# Patient Record
Sex: FEMALE | Race: WHITE | NOT HISPANIC OR LATINO | Employment: FULL TIME | URBAN - METROPOLITAN AREA
[De-identification: names, ages, dates, MRNs, and addresses within clinical notes are randomized per-mention and may not be internally consistent; named-entity substitution may affect disease eponyms.]

---

## 2021-06-04 ENCOUNTER — OFFICE VISIT (OUTPATIENT)
Dept: FAMILY MEDICINE CLINIC | Facility: CLINIC | Age: 26
End: 2021-06-04
Payer: COMMERCIAL

## 2021-06-04 VITALS
RESPIRATION RATE: 16 BRPM | BODY MASS INDEX: 43.59 KG/M2 | OXYGEN SATURATION: 99 % | HEIGHT: 63 IN | HEART RATE: 72 BPM | WEIGHT: 246 LBS | SYSTOLIC BLOOD PRESSURE: 118 MMHG | DIASTOLIC BLOOD PRESSURE: 76 MMHG

## 2021-06-04 DIAGNOSIS — E61.1 IRON DEFICIENCY: ICD-10-CM

## 2021-06-04 DIAGNOSIS — E55.9 VITAMIN D DEFICIENCY: ICD-10-CM

## 2021-06-04 DIAGNOSIS — Z00.00 WELL ADULT EXAM: Primary | ICD-10-CM

## 2021-06-04 DIAGNOSIS — E53.8 B12 DEFICIENCY: ICD-10-CM

## 2021-06-04 DIAGNOSIS — E66.01 CLASS 3 SEVERE OBESITY DUE TO EXCESS CALORIES WITH SERIOUS COMORBIDITY AND BODY MASS INDEX (BMI) OF 40.0 TO 44.9 IN ADULT (HCC): ICD-10-CM

## 2021-06-04 DIAGNOSIS — L20.89 FLEXURAL ATOPIC DERMATITIS: ICD-10-CM

## 2021-06-04 DIAGNOSIS — Z11.3 SCREEN FOR STD (SEXUALLY TRANSMITTED DISEASE): ICD-10-CM

## 2021-06-04 DIAGNOSIS — Z79.899 OTHER LONG TERM (CURRENT) DRUG THERAPY: ICD-10-CM

## 2021-06-04 PROCEDURE — 3725F SCREEN DEPRESSION PERFORMED: CPT | Performed by: FAMILY MEDICINE

## 2021-06-04 PROCEDURE — 99395 PREV VISIT EST AGE 18-39: CPT | Performed by: FAMILY MEDICINE

## 2021-06-04 PROCEDURE — 1036F TOBACCO NON-USER: CPT | Performed by: FAMILY MEDICINE

## 2021-06-04 PROCEDURE — 3008F BODY MASS INDEX DOCD: CPT | Performed by: FAMILY MEDICINE

## 2021-06-04 RX ORDER — MONTELUKAST SODIUM 10 MG/1
10 TABLET ORAL
Qty: 90 TABLET | Refills: 3 | Status: SHIPPED | OUTPATIENT
Start: 2021-06-04

## 2021-06-04 RX ORDER — AMOXICILLIN 875 MG/1
TABLET, COATED ORAL
COMMUNITY
Start: 2021-05-07 | End: 2021-11-22

## 2021-06-04 RX ORDER — ALBUTEROL SULFATE 90 UG/1
AEROSOL, METERED RESPIRATORY (INHALATION)
COMMUNITY
Start: 2021-05-07 | End: 2021-12-23 | Stop reason: SDUPTHER

## 2021-06-04 RX ORDER — IBUPROFEN 800 MG/1
800 TABLET ORAL
COMMUNITY
End: 2022-05-05 | Stop reason: ALTCHOICE

## 2021-06-04 RX ORDER — CLOBETASOL PROPIONATE 0.5 MG/G
CREAM TOPICAL 2 TIMES DAILY
Qty: 60 G | Refills: 0 | Status: SHIPPED | OUTPATIENT
Start: 2021-06-04 | End: 2022-04-21 | Stop reason: SDUPTHER

## 2021-06-04 RX ORDER — TRAZODONE HYDROCHLORIDE 50 MG/1
TABLET ORAL
COMMUNITY
Start: 2021-06-03 | End: 2022-05-05 | Stop reason: ALTCHOICE

## 2021-06-04 RX ORDER — NORGESTIMATE AND ETHINYL ESTRADIOL 7DAYSX3 LO
1 KIT ORAL
COMMUNITY
End: 2021-07-14 | Stop reason: SDUPTHER

## 2021-06-04 RX ORDER — CYCLOBENZAPRINE HCL 5 MG
5 TABLET ORAL
COMMUNITY
End: 2021-11-22

## 2021-06-07 NOTE — PROGRESS NOTES
Assessment/Plan:    1  Well adult exam  -     Ambulatory referral to Gynecology; Future  -     Lipid panel; Future; Expected date: 06/04/2021  -     Comprehensive metabolic panel; Future; Expected date: 06/04/2021  -     CBC; Future; Expected date: 06/04/2021  -     UA w Reflex to Microscopic w Reflex to Culture; Future; Expected date: 06/04/2021    2  Flexural atopic dermatitis  -     montelukast (SINGULAIR) 10 mg tablet; Take 1 tablet (10 mg total) by mouth daily at bedtime  -     clobetasol (TEMOVATE) 0 05 % cream; Apply topically 2 (two) times a day  -     Iron Panel (Includes Ferritin, Iron Sat%, Iron, and TIBC); Future    3  Vitamin D deficiency  -     Vitamin D 25 hydroxy; Future; Expected date: 06/04/2021    4  B12 deficiency  -     Vitamin B12; Future; Expected date: 06/04/2021    5  Iron deficiency    6  Class 3 severe obesity due to excess calories with serious comorbidity and body mass index (BMI) of 40 0 to 44 9 in adult (HCC)  -     TSH, 3rd generation with Free T4 reflex; Future; Expected date: 06/04/2021    7  Other long term (current) drug therapy  -     Hemoglobin A1C; Future; Expected date: 06/04/2021    8  Screen for STD (sexually transmitted disease)  -     Hepatitis panel, acute; Future  -     RPR; Future  -     HIV 1/2 Antigen/Antibody (4th Generation) w Reflex [facility]; Future  -     HSV 1 and 2-Spec Ab, IgG w/Reflex; Future         Subjective:      Patient ID: Elo Rubin is a 31 y o  female  HPI   Here  For yearly   Also with rash on flexural skin       The following portions of the patient's history were reviewed and updated as appropriate: allergies, current medications, past family history, past medical history, past social history, past surgical history and problem list     Review of Systems   Constitutional: Negative for fever and unexpected weight change  HENT: Negative for nosebleeds and trouble swallowing  Eyes: Negative for visual disturbance     Respiratory: Negative for chest tightness and shortness of breath  Cardiovascular: Negative for chest pain, palpitations and leg swelling  Gastrointestinal: Negative for abdominal pain, constipation, diarrhea and nausea  Endocrine: Negative for cold intolerance  Genitourinary: Negative for dysuria and urgency  Musculoskeletal: Negative for joint swelling and myalgias  Skin: Positive for rash  Neurological: Negative for tremors, seizures and syncope  Hematological: Does not bruise/bleed easily  Psychiatric/Behavioral: Negative for hallucinations and suicidal ideas  Objective:      /76 (BP Location: Right arm, Patient Position: Sitting, Cuff Size: Large)   Pulse 72   Resp 16   Ht 5' 3" (1 6 m)   Wt 112 kg (246 lb)   SpO2 99%   BMI 43 58 kg/m²     No visits with results within 2 Week(s) from this visit  Latest known visit with results is:   No results found for any previous visit  Physical Exam  Vitals signs and nursing note reviewed  Constitutional:       Appearance: She is well-developed  She is obese  HENT:      Head: Normocephalic and atraumatic  Neck:      Musculoskeletal: Normal range of motion and neck supple  Cardiovascular:      Rate and Rhythm: Normal rate and regular rhythm  Heart sounds: Normal heart sounds  No murmur  Pulmonary:      Effort: Pulmonary effort is normal       Breath sounds: Normal breath sounds  No wheezing or rales  Abdominal:      General: Bowel sounds are normal  There is no distension  Palpations: Abdomen is soft  Tenderness: There is no abdominal tenderness  Musculoskeletal: Normal range of motion  General: No tenderness  Lymphadenopathy:      Cervical: No cervical adenopathy  Skin:     General: Skin is warm and dry  Capillary Refill: Capillary refill takes less than 2 seconds  Findings: Rash present  Neurological:      Mental Status: She is alert and oriented to person, place, and time  Cranial Nerves: No cranial nerve deficit  Sensory: No sensory deficit  Motor: No abnormal muscle tone  Psychiatric:         Behavior: Behavior normal          Thought Content: Thought content normal          Judgment: Judgment normal        BMI Counseling: Body mass index is 43 58 kg/m²  The BMI is above normal  Nutrition recommendations include decreasing portion sizes, encouraging healthy choices of fruits and vegetables and limiting drinks that contain sugar  Exercise recommendations include moderate physical activity 150 minutes/week  No pharmacotherapy was ordered               Estelle Jaimes MD  [place] [id number]

## 2021-07-14 DIAGNOSIS — Z00.00 WELL ADULT EXAM: Primary | ICD-10-CM

## 2021-07-14 NOTE — TELEPHONE ENCOUNTER
Patient  requested a refill of birth control  I could not understand what medication she was requesting     [facility] to verify we had the correct

## 2021-07-15 RX ORDER — NORGESTIMATE AND ETHINYL ESTRADIOL 7DAYSX3 LO
1 KIT ORAL DAILY
Qty: 28 TABLET | Refills: 3 | Status: SHIPPED | OUTPATIENT
Start: 2021-07-15 | End: 2021-11-22

## 2021-11-22 ENCOUNTER — OFFICE VISIT (OUTPATIENT)
Dept: OBGYN CLINIC | Facility: CLINIC | Age: 26
End: 2021-11-22
Payer: COMMERCIAL

## 2021-11-22 VITALS — DIASTOLIC BLOOD PRESSURE: 64 MMHG | WEIGHT: 251 LBS | SYSTOLIC BLOOD PRESSURE: 118 MMHG | BODY MASS INDEX: 44.46 KG/M2

## 2021-11-22 DIAGNOSIS — Z30.41 ENCOUNTER FOR SURVEILLANCE OF CONTRACEPTIVE PILLS: ICD-10-CM

## 2021-11-22 DIAGNOSIS — Z01.419 WELL WOMAN EXAM WITH ROUTINE GYNECOLOGICAL EXAM: Primary | ICD-10-CM

## 2021-11-22 PROCEDURE — 99385 PREV VISIT NEW AGE 18-39: CPT | Performed by: OBSTETRICS & GYNECOLOGY

## 2021-11-22 PROCEDURE — 1036F TOBACCO NON-USER: CPT | Performed by: OBSTETRICS & GYNECOLOGY

## 2021-11-22 PROCEDURE — G0145 SCR C/V CYTO,THINLAYER,RESCR: HCPCS | Performed by: OBSTETRICS & GYNECOLOGY

## 2021-11-22 RX ORDER — NORETHINDRONE ACETATE/ETHINYL ESTRADIOL AND FERROUS FUMARATE 1MG-20(24)
1 KIT ORAL DAILY
Qty: 84 TABLET | Refills: 4 | Status: SHIPPED | OUTPATIENT
Start: 2021-11-22

## 2021-11-30 LAB
LAB AP GYN PRIMARY INTERPRETATION: NORMAL
Lab: NORMAL

## 2021-12-06 ENCOUNTER — TELEPHONE (OUTPATIENT)
Dept: OBGYN CLINIC | Facility: CLINIC | Age: 26
End: 2021-12-06

## 2021-12-16 RX ORDER — GUAIFENESIN AND CODEINE PHOSPHATE 100; 10 MG/5ML; MG/5ML
5 SOLUTION ORAL DAILY PRN
COMMUNITY
Start: 2021-12-14 | End: 2022-01-06 | Stop reason: ALTCHOICE

## 2021-12-16 RX ORDER — PREDNISONE 20 MG/1
60 TABLET ORAL DAILY
COMMUNITY
Start: 2021-12-12 | End: 2021-12-17 | Stop reason: ALTCHOICE

## 2021-12-16 RX ORDER — LORATADINE AND PSEUDOEPHEDRINE SULFATE 10; 240 MG/1; MG/1
1 TABLET, EXTENDED RELEASE ORAL DAILY
COMMUNITY
Start: 2021-12-14 | End: 2021-12-24

## 2021-12-17 ENCOUNTER — OFFICE VISIT (OUTPATIENT)
Dept: FAMILY MEDICINE CLINIC | Facility: CLINIC | Age: 26
End: 2021-12-17
Payer: COMMERCIAL

## 2021-12-17 VITALS
WEIGHT: 252 LBS | OXYGEN SATURATION: 98 % | SYSTOLIC BLOOD PRESSURE: 116 MMHG | HEIGHT: 63 IN | BODY MASS INDEX: 44.65 KG/M2 | DIASTOLIC BLOOD PRESSURE: 72 MMHG | HEART RATE: 83 BPM | RESPIRATION RATE: 16 BRPM

## 2021-12-17 DIAGNOSIS — J45.21 MILD INTERMITTENT ASTHMA WITH EXACERBATION: ICD-10-CM

## 2021-12-17 DIAGNOSIS — J20.9 ACUTE BRONCHITIS, UNSPECIFIED ORGANISM: Primary | ICD-10-CM

## 2021-12-17 PROCEDURE — 3725F SCREEN DEPRESSION PERFORMED: CPT | Performed by: FAMILY MEDICINE

## 2021-12-17 PROCEDURE — 3008F BODY MASS INDEX DOCD: CPT | Performed by: FAMILY MEDICINE

## 2021-12-17 PROCEDURE — 1036F TOBACCO NON-USER: CPT | Performed by: FAMILY MEDICINE

## 2021-12-17 PROCEDURE — 99213 OFFICE O/P EST LOW 20 MIN: CPT | Performed by: FAMILY MEDICINE

## 2021-12-17 RX ORDER — PREDNISONE 10 MG/1
TABLET ORAL
Qty: 18 TABLET | Refills: 0 | Status: SHIPPED | OUTPATIENT
Start: 2021-12-17 | End: 2022-01-06 | Stop reason: ALTCHOICE

## 2021-12-17 RX ORDER — ALBUTEROL SULFATE 2.5 MG/3ML
SOLUTION RESPIRATORY (INHALATION)
COMMUNITY
Start: 2021-12-13 | End: 2021-12-17 | Stop reason: SDUPTHER

## 2021-12-17 RX ORDER — CEFDINIR 300 MG/1
300 CAPSULE ORAL EVERY 12 HOURS SCHEDULED
Qty: 20 CAPSULE | Refills: 0 | Status: SHIPPED | OUTPATIENT
Start: 2021-12-17 | End: 2021-12-27

## 2021-12-17 RX ORDER — ALBUTEROL SULFATE 2.5 MG/3ML
2.5 SOLUTION RESPIRATORY (INHALATION) EVERY 4 HOURS
Qty: 540 ML | Refills: 0 | Status: SHIPPED | OUTPATIENT
Start: 2021-12-17 | End: 2022-01-16

## 2021-12-23 DIAGNOSIS — J45.21 MILD INTERMITTENT ASTHMA WITH EXACERBATION: Primary | ICD-10-CM

## 2021-12-23 RX ORDER — ALBUTEROL SULFATE 90 UG/1
2 AEROSOL, METERED RESPIRATORY (INHALATION) EVERY 4 HOURS PRN
Qty: 18 G | Refills: 1 | Status: SHIPPED | OUTPATIENT
Start: 2021-12-23 | End: 2022-03-01

## 2022-01-03 ENCOUNTER — TELEMEDICINE (OUTPATIENT)
Dept: FAMILY MEDICINE CLINIC | Facility: CLINIC | Age: 27
End: 2022-01-03
Payer: COMMERCIAL

## 2022-01-03 VITALS — HEIGHT: 63 IN | WEIGHT: 252 LBS | TEMPERATURE: 97.1 F | BODY MASS INDEX: 44.65 KG/M2

## 2022-01-03 DIAGNOSIS — J34.89 RHINORRHEA: ICD-10-CM

## 2022-01-03 DIAGNOSIS — R05.8 DRY COUGH: Primary | ICD-10-CM

## 2022-01-03 DIAGNOSIS — R07.89 CHEST TIGHTNESS: ICD-10-CM

## 2022-01-03 DIAGNOSIS — G43.009 MIGRAINE WITHOUT AURA AND WITHOUT STATUS MIGRAINOSUS, NOT INTRACTABLE: ICD-10-CM

## 2022-01-03 DIAGNOSIS — R09.81 NASAL CONGESTION: ICD-10-CM

## 2022-01-03 PROCEDURE — 1036F TOBACCO NON-USER: CPT | Performed by: FAMILY MEDICINE

## 2022-01-03 PROCEDURE — 99213 OFFICE O/P EST LOW 20 MIN: CPT | Performed by: FAMILY MEDICINE

## 2022-01-03 RX ORDER — BENZONATATE 100 MG/1
100 CAPSULE ORAL 3 TIMES DAILY PRN
Qty: 20 CAPSULE | Refills: 0 | Status: SHIPPED | OUTPATIENT
Start: 2022-01-03 | End: 2022-02-21 | Stop reason: ALTCHOICE

## 2022-01-03 RX ORDER — SUMATRIPTAN 25 MG/1
25 TABLET, FILM COATED ORAL ONCE AS NEEDED
Qty: 10 TABLET | Refills: 0 | Status: SHIPPED | OUTPATIENT
Start: 2022-01-03 | End: 2022-02-21 | Stop reason: ALTCHOICE

## 2022-01-03 NOTE — PROGRESS NOTES
COVID-19 Outpatient Progress Note    Disposition:     Patient is not fully vaccinated and I recommended self quarantine for 5 days followed by strict mask use for an additional 5 days  If patient were to develop symptoms, they should immediately self isolate and call our office for further guidance  Patient to continue and monitor her symptoms  I am concerned about the severity of her headaches  It could be worse 2/2 to COVID and nearing hormonal changes of her menstrual cycle  She is to attempt abortive therapy with imitrex  Initially steroids were offered to help with her asthma and headache  She declined and wanted to attempt the migraine medication first due to history of prolonged steroid use in the past   If her asthma is worsening she is to call prior to Friday when we will follow up  Also she was given red flag symptoms to go to ED for severe headache with neurological changes  I have spent 12 minutes directly with the patient  This virtual check-in was done via [employer] and patient was informed that this is a secure, HIPAA-compliant platform  She agrees to proceed  Patient agrees to participate in a virtual check in via telephone or video visit instead of presenting to the office to address urgent/immediate medical needs  Patient is aware this is a billable service  After connecting through [facility], the patient was identified by name and date of birth  [facility] was informed that this was a telemedicine visit and that the exam was being conducted confidentially over secure lines  My office door was closed  No one else was in the room  [facility] acknowledged consent and understanding of privacy and security of the telemedicine visit  I informed the patient that I have reviewed her record in Epic and presented the opportunity for her to ask any questions regarding the visit today  The patient agreed to participate      Verification of patient location:  Patient is located in the following state in which I hold an active license: [place]    Subjective:   Johnnie Petty is a 31 y o  female who is concerned about COVID-19  Patient's symptoms include fatigue, nasal congestion, rhinorrhea, cough (dry and hacking), chest tightness (using albuterol ), nausea and headache  Patient denies fever, chills, malaise, sore throat, anosmia, loss of taste, shortness of breath, abdominal pain, vomiting, diarrhea and myalgias  Date of symptom onset: 1/1/2022  Date of exposure: 12/25/2021  COVID-19 vaccination status: Fully vaccinated (primary series)    Exposure:   Contact with a person who is under investigation (PUI) for or who is positive for COVID-19 within the last 14 days?: Yes    Hospitalized recently for fever and/or lower respiratory symptoms?: No      Currently a healthcare worker that is involved in direct patient care?: No      Works in a special setting where the risk of COVID-19 transmission may be high? (this may include long-term care, correctional and custodial facilities; homeless shelters; assisted-living facilities and group homes ): No      Resident in a special setting where the risk of COVID-19 transmission may be high? (this may include long-term care, correctional and custodial facilities; homeless shelters; assisted-living facilities and group homes ): No      Patient has history of migraines typically around her period  Her period is nearing but her migraines are much more severe  The patient has not had any changes to sight, taste, smell, hearing, or sensation  She typically has them at night prior to bed causing decreased sleep  This may be contributing  She has attempted tylenol and ibuprofen, up to 800mg without improvement which is odd    She is interested in treatment options for headaches, the rest of her symptoms are tolerable     Lab Results   Component Value Date    SARSCOV2 NOT DETECTED 02/02/2021    [facility] Not Detected 12/12/2021     Past Medical History:   Diagnosis Date    Asthma     Heavy menses     OCP use     Past Surgical History:   Procedure Laterality Date    MYRINGOTOMY W/ TUBES      TONSILLECTOMY      WISDOM TOOTH EXTRACTION       Current Outpatient Medications   Medication Sig Dispense Refill    albuterol (2 5 mg/3 mL) 0 083 % nebulizer solution Take 3 mL (2 5 mg total) by nebulization every 4 (four) hours 540 mL 0    albuterol (PROVENTIL HFA,VENTOLIN HFA) 90 mcg/act inhaler Inhale 2 puffs every 4 (four) hours as needed for wheezing 18 g 1    clobetasol (TEMOVATE) 0 05 % cream Apply topically 2 (two) times a day 60 g 0    montelukast (SINGULAIR) 10 mg tablet Take 1 tablet (10 mg total) by mouth daily at bedtime 90 tablet 3    norethindrone-ethinyl estradiol-ferrous fumarate (Zuhair 24 FE) 1-20 MG-MCG(24) per tablet Take 1 tablet by mouth daily 84 tablet 4    sertraline (ZOLOFT) 50 mg tablet       tolnaftate (TINACTIN) 1 % external solution Apply topically      traZODone (DESYREL) 50 mg tablet       dextromethorphan-guaifenesin (MUCINEX DM)  MG per 12 hr tablet Take 2 tablets by mouth every morning      guaifenesin-codeine (GUAIFENESIN AC) 100-10 MG/5ML liquid Take 5 mL by mouth daily as needed      ibuprofen (MOTRIN) 800 mg tablet Take 800 mg by mouth      predniSONE 10 mg tablet Take 1 tablet tid x3d then 1 tab bid x3d then 1 tab qd x3d 18 tablet 0     No current facility-administered medications for this visit  Allergies   Allergen Reactions    Naproxen GI Intolerance    Pollen Extract Allergic Rhinitis       Review of Systems   Constitutional: Positive for fatigue  Negative for chills and fever  HENT: Positive for congestion and rhinorrhea  Negative for sore throat  Respiratory: Positive for cough (dry and hacking) and chest tightness (using albuterol )  Negative for shortness of breath  Gastrointestinal: Positive for nausea   Negative for abdominal pain, diarrhea and vomiting  Musculoskeletal: Negative for myalgias  Neurological: Positive for headaches  Objective:    Vitals:    01/03/22 1100   Temp: (!) 97 1 °F (36 2 °C)   Weight: 114 kg (252 lb)   Height: 5' 3" (1 6 m)       Physical Exam  Mild respiratory distress  No increased respiratory rate, but difficult completing full sentences  No retractions, subtle wheeze with tightness character of voice  Good color in face and cheeks  Nasal voice and nasal congestion noted  No flushing of face  Dorota Adams was seen today for headache, cough, cold like symptoms and covid-19  Diagnoses and all orders for this visit:    Dry cough  Nasal congestion  Rhinorrhea  Chest tightness  Migraine without aura and without status migrainosus, not intractable  Patient has dry cough and sounds to be tight  She is likely in mild to moderate asthma exacerbation  The patient is to use albuterol nebulizer and continue to use albuterol inhaler as needed every 6 hours  Offered the patient steroids to help with headaches as well as asthma but preferred to attempt treatment with albuterol and abortative therapy for her migraines  She Also can attempt Mucinex DM and tessalon for cough and to improve sleep  Patient to continue to monitor and if steroids are needed she will follow up sooner than later she will follow up earlier than Friday    -     SUMAtriptan (Imitrex) 25 mg tablet; Take 1 tablet (25 mg total) by mouth once as needed for migraine for up to 10 doses  -     dextromethorphan-guaifenesin (MUCINEX DM)  MG per 12 hr tablet; Take 2 tablets by mouth every morning  -     benzonatate (TESSALON PERLES) 100 mg capsule;  Take 1 capsule (100 mg total) by mouth 3 (three) times a day as needed for cough    Encounter provider Nazia Echols DO    Provider located at [address] [place] [id number]    Recent Visits  No visits were found meeting these conditions  Showing recent visits within past 7 days and meeting all other requirements  Today's Visits  Date Type Provider Dept   01/03/22 Telemedicine Estrella Araujo DO [facility]   Showing today's visits and meeting all other requirements  Future Appointments  No visits were found meeting these conditions  Showing future appointments within next 150 days and meeting all other requirements    VIRTUAL VISIT DISCLAIMER    Krishan Stovall verbally agrees to participate in [employer]  Pt is aware that [employer] could be limited without vital signs or the ability to perform a full hands-on physical exam  Graciela Dubon understands she or the provider may request at any time to terminate the video visit and request the patient to seek care or treatment in person

## 2022-01-07 ENCOUNTER — TELEMEDICINE (OUTPATIENT)
Dept: FAMILY MEDICINE CLINIC | Facility: CLINIC | Age: 27
End: 2022-01-07
Payer: COMMERCIAL

## 2022-01-07 VITALS — HEIGHT: 63 IN | BODY MASS INDEX: 44.65 KG/M2 | TEMPERATURE: 98.2 F | WEIGHT: 252 LBS

## 2022-01-07 DIAGNOSIS — R42 DYSEQUILIBRIUM: Primary | ICD-10-CM

## 2022-01-07 DIAGNOSIS — G43.009 MIGRAINE WITHOUT AURA AND WITHOUT STATUS MIGRAINOSUS, NOT INTRACTABLE: ICD-10-CM

## 2022-01-07 DIAGNOSIS — R09.81 NASAL CONGESTION: ICD-10-CM

## 2022-01-07 DIAGNOSIS — R05.8 DRY COUGH: ICD-10-CM

## 2022-01-07 DIAGNOSIS — R11.2 NON-INTRACTABLE VOMITING WITH NAUSEA, UNSPECIFIED VOMITING TYPE: ICD-10-CM

## 2022-01-07 PROCEDURE — 3008F BODY MASS INDEX DOCD: CPT | Performed by: FAMILY MEDICINE

## 2022-01-07 PROCEDURE — 99441 PR PHYS/QHP TELEPHONE EVALUATION 5-10 MIN: CPT | Performed by: FAMILY MEDICINE

## 2022-01-07 RX ORDER — ONDANSETRON 4 MG/1
4 TABLET, ORALLY DISINTEGRATING ORAL EVERY 6 HOURS PRN
Qty: 20 TABLET | Refills: 0 | Status: SHIPPED | OUTPATIENT
Start: 2022-01-07 | End: 2022-03-26 | Stop reason: SDUPTHER

## 2022-01-07 RX ORDER — MECLIZINE HCL 12.5 MG/1
12.5 TABLET ORAL EVERY 8 HOURS PRN
Qty: 30 TABLET | Refills: 0 | Status: SHIPPED | OUTPATIENT
Start: 2022-01-07 | End: 2022-03-26 | Stop reason: SDUPTHER

## 2022-01-07 NOTE — PROGRESS NOTES
COVID-19 Outpatient Progress Note    Disposition:     Patient has COVID-19 infection  Based off CDC guidelines, they were recommended to isolate for 5 days from the date of the positive test  If they remain asymptomatic, isolation may be ended followed by 5 days of wearing a mask when around othes to minimize risk of infecting others  If they have a fever, continue to stay home until fever resolves for at least 24 hours  Patient continues to have symptoms of dysequilibrium  The patient is having nausea and vomiting that are related to balance or "inside her head  Will have the patient continue to hydrate well and start taking antivert to see if this helps the inner ear  If this is not helpful after several doses she should use ODT zofran until Monday to follow up with treatment  I have spent 10 minutes directly with the patient  This virtual check-in was done via [employer] and patient was informed that this is a secure, HIPAA-compliant platform  She agrees to proceed  Patient agrees to participate in a virtual check in via telephone or video visit instead of presenting to the office to address urgent/immediate medical needs  Patient is aware this is a billable service  After connecting through [facility], the patient was identified by name and date of birth  Loretoellie Osborne was informed that this was a telemedicine visit and that the exam was being conducted confidentially over secure lines  My office door was closed  No one else was in the room  Loretoellie Osborne acknowledged consent and understanding of privacy and security of the telemedicine visit  I informed the patient that I have reviewed her record in Epic and presented the opportunity for her to ask any questions regarding the visit today  The patient agreed to participate      Verification of patient location:  Patient is located in the following state in which I hold an active license: [place]    Subjective: Ana Lu is a 31 y o  female who has been screened for COVID-19  Symptom change since last report: improving  Patient's symptoms include fatigue, malaise, nasal congestion, rhinorrhea, cough, nausea and vomiting  Patient denies fever, chills, sore throat, anosmia, loss of taste, shortness of breath, chest tightness, abdominal pain, diarrhea, myalgias and headaches  Date of symptom onset: 1/1/2022  Date of exposure: 12/25/2021  COVID-19 vaccination status: Fully vaccinated (primary series)    Patient notes that her symptoms overall are improving  The patient has been having increased nausea and last night she vomited  She feels that it may have to do with her equilibrium or in her head  She does not feels that her stomach or GI tract is irritated  Her headache, fever, chills, and muscle aches have improved        Lab Results   Component Value Date    SARSCOV2 NOT DETECTED 02/02/2021    [address] Not Detected 12/12/2021     Past Medical History:   Diagnosis Date    Asthma     Heavy menses     OCP use     Past Surgical History:   Procedure Laterality Date    MYRINGOTOMY W/ TUBES      TONSILLECTOMY      WISDOM TOOTH EXTRACTION       Current Outpatient Medications   Medication Sig Dispense Refill    albuterol (2 5 mg/3 mL) 0 083 % nebulizer solution Take 3 mL (2 5 mg total) by nebulization every 4 (four) hours 540 mL 0    albuterol (PROVENTIL HFA,VENTOLIN HFA) 90 mcg/act inhaler Inhale 2 puffs every 4 (four) hours as needed for wheezing 18 g 1    benzonatate (TESSALON PERLES) 100 mg capsule Take 1 capsule (100 mg total) by mouth 3 (three) times a day as needed for cough 20 capsule 0    clobetasol (TEMOVATE) 0 05 % cream Apply topically 2 (two) times a day 60 g 0    dextromethorphan-guaifenesin (MUCINEX DM)  MG per 12 hr tablet Take 2 tablets by mouth every morning 60 tablet 0    ibuprofen (MOTRIN) 800 mg tablet Take 800 mg by mouth      montelukast (SINGULAIR) 10 mg tablet Take 1 tablet (10 mg total) by mouth daily at bedtime 90 tablet 3    norethindrone-ethinyl estradiol-ferrous fumarate (Zuhair 24 FE) 1-20 MG-MCG(24) per tablet Take 1 tablet by mouth daily 84 tablet 4    sertraline (ZOLOFT) 50 mg tablet       SUMAtriptan (Imitrex) 25 mg tablet Take 1 tablet (25 mg total) by mouth once as needed for migraine for up to 10 doses 10 tablet 0    traZODone (DESYREL) 50 mg tablet       meclizine (ANTIVERT) 12 5 MG tablet Take 1 tablet (12 5 mg total) by mouth every 8 (eight) hours as needed for dizziness 30 tablet 0    ondansetron (Zofran ODT) 4 mg disintegrating tablet Take 1 tablet (4 mg total) by mouth every 6 (six) hours as needed for nausea or vomiting 20 tablet 0     No current facility-administered medications for this visit  Allergies   Allergen Reactions    Naproxen GI Intolerance    Pollen Extract Allergic Rhinitis       Review of Systems   Constitutional: Positive for fatigue  Negative for chills and fever  HENT: Positive for congestion and rhinorrhea  Negative for sore throat  Respiratory: Positive for cough  Negative for chest tightness and shortness of breath  Gastrointestinal: Positive for nausea and vomiting  Negative for abdominal pain and diarrhea  Musculoskeletal: Negative for myalgias  Neurological: Negative for headaches  Objective:    Vitals:    01/07/22 1140   Temp: 98 2 °F (36 8 °C)   Weight: 114 kg (252 lb)   Height: 5' 3" (1 6 m)       Physical Exam  No apparent distress  No respiratory distress, increased respiratory rate  No retractions and Speaking in full sentences  Good color in face and cheeks  Nasal voice and nasal congestion noted  No flushing of face  Attempted to perform EOM or look for nystagmus   - unable to perform adequately  Vikas William was seen today for nausea, cold like symptoms and covid-19    Diagnoses and all orders for this visit:    Dysequilibrium  Migraine without aura and without status migrainosus, not intractable  Dry cough  Nasal congestion  Non-intractable vomiting with nausea, unspecified vomiting type  Patient is recovering from positive COVID  She has had a resolution or improvement in many of her symptoms  She continues to have nausea or dysequilibrium  She is to start with using the meclizine and follow up with Zofran if it does not work  She will follow up on Monday if balance and nausea not improved  -     meclizine (ANTIVERT) 12 5 MG tablet; Take 1 tablet (12 5 mg total) by mouth every 8 (eight) hours as needed for dizziness  -     ondansetron (Zofran ODT) 4 mg disintegrating tablet; Take 1 tablet (4 mg total) by mouth every 6 (six) hours as needed for nausea or vomiting      VIRTUAL VISIT DISCLAIMER    Daniel Hoffman verbally agrees to participate in [employer]  Pt is aware that [employer] could be limited without vital signs or the ability to perform a full hands-on physical exam  Graciela Haas understands she or the provider may request at any time to terminate the video visit and request the patient to seek care or treatment in person

## 2022-02-21 ENCOUNTER — OFFICE VISIT (OUTPATIENT)
Dept: FAMILY MEDICINE CLINIC | Facility: CLINIC | Age: 27
End: 2022-02-21
Payer: COMMERCIAL

## 2022-02-21 VITALS
DIASTOLIC BLOOD PRESSURE: 74 MMHG | OXYGEN SATURATION: 98 % | HEART RATE: 86 BPM | WEIGHT: 255 LBS | BODY MASS INDEX: 45.18 KG/M2 | HEIGHT: 63 IN | RESPIRATION RATE: 16 BRPM | SYSTOLIC BLOOD PRESSURE: 110 MMHG

## 2022-02-21 DIAGNOSIS — H72.91 PERFORATION OF RIGHT TYMPANIC MEMBRANE: Primary | ICD-10-CM

## 2022-02-21 PROCEDURE — 3008F BODY MASS INDEX DOCD: CPT | Performed by: FAMILY MEDICINE

## 2022-02-21 PROCEDURE — 1036F TOBACCO NON-USER: CPT | Performed by: FAMILY MEDICINE

## 2022-02-21 PROCEDURE — 99213 OFFICE O/P EST LOW 20 MIN: CPT | Performed by: FAMILY MEDICINE

## 2022-02-21 RX ORDER — CEFDINIR 300 MG/1
CAPSULE ORAL
COMMUNITY
Start: 2022-02-04 | End: 2022-02-21 | Stop reason: ALTCHOICE

## 2022-02-21 RX ORDER — OFLOXACIN 3 MG/ML
SOLUTION AURICULAR (OTIC)
COMMUNITY
Start: 2022-02-04 | End: 2022-02-21 | Stop reason: ALTCHOICE

## 2022-02-21 RX ORDER — FLUTICASONE PROPIONATE 50 MCG
1 SPRAY, SUSPENSION (ML) NASAL DAILY
Qty: 16 ML | Refills: 0 | Status: SHIPPED | OUTPATIENT
Start: 2022-02-21

## 2022-02-21 RX ORDER — LORATADINE AND PSEUDOEPHEDRINE SULFATE 10; 240 MG/1; MG/1
1 TABLET, EXTENDED RELEASE ORAL
COMMUNITY
Start: 2021-12-14 | End: 2022-02-21 | Stop reason: ALTCHOICE

## 2022-02-21 RX ORDER — OFLOXACIN 3 MG/ML
5 SOLUTION AURICULAR (OTIC) 2 TIMES DAILY
Qty: 5 ML | Refills: 1 | Status: SHIPPED | OUTPATIENT
Start: 2022-02-21 | End: 2022-05-05 | Stop reason: ALTCHOICE

## 2022-02-21 NOTE — PROGRESS NOTES
Assessment/Plan:    1  Perforation of right tympanic membrane  -     fluticasone (FLONASE) 50 mcg/act nasal spray; 1 spray into each nostril daily  -     ofloxacin (FLOXIN) 0 3 % otic solution; Administer 5 drops to the right ear 2 (two) times a day         Subjective:      Patient ID: Loreto Osborne is a 31 y o  female  HPI  aom BL   With RIGHT sided perf     2 5 weeks ago [facility] to [facility]     Gave orals and drops     Finished it Tuesday     Feels fluid and hears fluid   And nausea (not pregnant)     Felt this fluid back then too     The following portions of the patient's history were reviewed and updated as appropriate: allergies, current medications, past family history, past medical history, past social history, past surgical history and problem list     Review of Systems   Constitutional: Negative for fever and unexpected weight change  HENT: Positive for ear pain  Negative for nosebleeds and trouble swallowing  Eyes: Negative for visual disturbance  Respiratory: Negative for chest tightness and shortness of breath  Cardiovascular: Negative for chest pain, palpitations and leg swelling  Gastrointestinal: Negative for abdominal pain, constipation, diarrhea and nausea  Endocrine: Negative for cold intolerance  Genitourinary: Negative for dysuria and urgency  Musculoskeletal: Negative for joint swelling and myalgias  Skin: Negative for rash  Neurological: Positive for dizziness  Negative for tremors, seizures and syncope  Hematological: Does not bruise/bleed easily  Psychiatric/Behavioral: Negative for hallucinations and suicidal ideas  Objective:      /74 (BP Location: Right arm, Patient Position: Sitting, Cuff Size: Large)   Pulse 86   Resp 16   Ht 5' 3" (1 6 m)   Wt 116 kg (255 lb)   SpO2 98%   BMI 45 17 kg/m²     No visits with results within 2 Week(s) from this visit     Latest known visit with results is:   Office Visit on 11/22/2021   Component Date Value    Case Report 11/22/2021                      Value:Gynecologic Cytology Report                       Case: Erika Pool Provider:  Elizabeth England MD     Collected:           11/22/2021 1353              Ordering Location:     [address] [place] [address] Road  Received:            11/22/2021 [address]                                                        First Screen:          Pedro Erazoner                                                               Specimen:    LIQUID-BASED PAP, SCREENING, Endocervical                                                  Primary Interpretation 11/22/2021 Negative for intraepithelial lesion or malignancy     Specimen Adequacy 11/22/2021 Satisfactory for evaluation  Absence of endocervical/transformation zone component   Additional Information 11/22/2021                      Value: This result contains rich text formatting which cannot be displayed here            Physical Exam  HENT:      Ears:               Nithya Hale MD  [address]

## 2022-03-01 DIAGNOSIS — J45.21 MILD INTERMITTENT ASTHMA WITH EXACERBATION: ICD-10-CM

## 2022-03-01 RX ORDER — ALBUTEROL SULFATE 90 UG/1
AEROSOL, METERED RESPIRATORY (INHALATION)
Qty: 18 G | Refills: 1 | Status: SHIPPED | OUTPATIENT
Start: 2022-03-01

## 2022-03-26 ENCOUNTER — NURSE TRIAGE (OUTPATIENT)
Dept: OTHER | Facility: OTHER | Age: 27
End: 2022-03-26

## 2022-03-26 DIAGNOSIS — R11.2 NON-INTRACTABLE VOMITING WITH NAUSEA, UNSPECIFIED VOMITING TYPE: ICD-10-CM

## 2022-03-26 DIAGNOSIS — R42 DYSEQUILIBRIUM: ICD-10-CM

## 2022-03-26 NOTE — TELEPHONE ENCOUNTER
Regarding: ondansetron (Zofran ODT) 4 mg disintegrating tablet-meclizine (ANTIVERT) 12 5 MG  ----- Message from Laverne Mendes sent at 3/26/2022 10:20 AM EDT -----  "I am out of meclizine (ANTIVERT) 12 5 MG tablet and ondansetron (Zofran ODT) 4 mg disintegrating tablet "

## 2022-03-29 RX ORDER — MECLIZINE HCL 12.5 MG/1
12.5 TABLET ORAL EVERY 8 HOURS PRN
Qty: 30 TABLET | Refills: 1 | Status: SHIPPED | OUTPATIENT
Start: 2022-03-29 | End: 2022-05-05 | Stop reason: SDUPTHER

## 2022-03-29 RX ORDER — ONDANSETRON 4 MG/1
4 TABLET, ORALLY DISINTEGRATING ORAL EVERY 6 HOURS PRN
Qty: 20 TABLET | Refills: 1 | Status: SHIPPED | OUTPATIENT
Start: 2022-03-29 | End: 2022-05-05 | Stop reason: SDUPTHER

## 2022-04-21 DIAGNOSIS — L20.89 FLEXURAL ATOPIC DERMATITIS: ICD-10-CM

## 2022-04-22 RX ORDER — CLOBETASOL PROPIONATE 0.5 MG/G
CREAM TOPICAL 2 TIMES DAILY
Qty: 60 G | Refills: 0 | Status: SHIPPED | OUTPATIENT
Start: 2022-04-22

## 2022-05-04 RX ORDER — AZITHROMYCIN 250 MG/1
TABLET, FILM COATED ORAL
COMMUNITY
Start: 2022-03-23 | End: 2022-05-05 | Stop reason: ALTCHOICE

## 2022-05-04 RX ORDER — LORATADINE AND PSEUDOEPHEDRINE SULFATE 10; 240 MG/1; MG/1
1 TABLET, EXTENDED RELEASE ORAL DAILY
COMMUNITY
Start: 2022-03-24 | End: 2022-06-16

## 2022-05-04 RX ORDER — SUMATRIPTAN 25 MG/1
TABLET, FILM COATED ORAL
COMMUNITY
Start: 2022-01-03

## 2022-05-04 RX ORDER — CODEINE PHOSPHATE AND GUAIFENESIN 10; 100 MG/5ML; MG/5ML
SOLUTION ORAL
COMMUNITY
Start: 2022-03-24 | End: 2022-05-05 | Stop reason: ALTCHOICE

## 2022-05-05 ENCOUNTER — OFFICE VISIT (OUTPATIENT)
Dept: FAMILY MEDICINE CLINIC | Facility: CLINIC | Age: 27
End: 2022-05-05
Payer: COMMERCIAL

## 2022-05-05 VITALS
BODY MASS INDEX: 43.59 KG/M2 | HEART RATE: 59 BPM | SYSTOLIC BLOOD PRESSURE: 116 MMHG | RESPIRATION RATE: 16 BRPM | HEIGHT: 63 IN | DIASTOLIC BLOOD PRESSURE: 78 MMHG | OXYGEN SATURATION: 100 % | WEIGHT: 246 LBS

## 2022-05-05 DIAGNOSIS — F42.9 OBSESSIVE-COMPULSIVE DISORDER, UNSPECIFIED TYPE: ICD-10-CM

## 2022-05-05 DIAGNOSIS — R42 DIZZINESS: ICD-10-CM

## 2022-05-05 DIAGNOSIS — R42 DYSEQUILIBRIUM: ICD-10-CM

## 2022-05-05 DIAGNOSIS — F41.9 ANXIETY: Primary | ICD-10-CM

## 2022-05-05 DIAGNOSIS — F43.10 POST TRAUMATIC STRESS DISORDER (PTSD): ICD-10-CM

## 2022-05-05 DIAGNOSIS — R11.0 NAUSEA: ICD-10-CM

## 2022-05-05 DIAGNOSIS — E66.01 CLASS 3 SEVERE OBESITY DUE TO EXCESS CALORIES WITH SERIOUS COMORBIDITY AND BODY MASS INDEX (BMI) OF 40.0 TO 44.9 IN ADULT (HCC): ICD-10-CM

## 2022-05-05 PROCEDURE — 99214 OFFICE O/P EST MOD 30 MIN: CPT | Performed by: FAMILY MEDICINE

## 2022-05-05 PROCEDURE — 3008F BODY MASS INDEX DOCD: CPT | Performed by: FAMILY MEDICINE

## 2022-05-05 PROCEDURE — 1036F TOBACCO NON-USER: CPT | Performed by: FAMILY MEDICINE

## 2022-05-05 RX ORDER — ONDANSETRON 4 MG/1
4 TABLET, ORALLY DISINTEGRATING ORAL EVERY 6 HOURS PRN
Qty: 20 TABLET | Refills: 1 | Status: SHIPPED | OUTPATIENT
Start: 2022-05-05 | End: 2022-06-16

## 2022-05-05 RX ORDER — MECLIZINE HCL 12.5 MG/1
12.5 TABLET ORAL EVERY 8 HOURS PRN
Qty: 30 TABLET | Refills: 1 | Status: SHIPPED | OUTPATIENT
Start: 2022-05-05 | End: 2022-06-16

## 2022-05-05 RX ORDER — ESCITALOPRAM OXALATE 10 MG/1
10 TABLET ORAL DAILY
Qty: 30 TABLET | Refills: 1 | Status: SHIPPED | OUTPATIENT
Start: 2022-05-05 | End: 2022-05-26

## 2022-05-05 NOTE — PROGRESS NOTES
Assessment/Plan:    Lets get her to therapy   And start a different ssri   Ill write for the SE meds just incase       1  Anxiety  -     escitalopram (Lexapro) 10 mg tablet; Take 1 tablet (10 mg total) by mouth daily  -     Ambulatory Referral to [facility]; Future    2  Dysequilibrium  -     ondansetron (Zofran ODT) 4 mg disintegrating tablet; Take 1 tablet (4 mg total) by mouth every 6 (six) hours as needed for nausea or vomiting  -     meclizine (ANTIVERT) 12 5 MG tablet; Take 1 tablet (12 5 mg total) by mouth every 8 (eight) hours as needed for dizziness    3  Nausea  -     ondansetron (Zofran ODT) 4 mg disintegrating tablet; Take 1 tablet (4 mg total) by mouth every 6 (six) hours as needed for nausea or vomiting    4  Dizziness  -     meclizine (ANTIVERT) 12 5 MG tablet; Take 1 tablet (12 5 mg total) by mouth every 8 (eight) hours as needed for dizziness    5  Obsessive-compulsive disorder, unspecified type  -     escitalopram (Lexapro) 10 mg tablet; Take 1 tablet (10 mg total) by mouth daily  -     Ambulatory Referral to L[facility]; Future    6  Post traumatic stress disorder (PTSD)  -     escitalopram (Lexapro) 10 mg tablet; Take 1 tablet (10 mg total) by mouth daily  -     Ambulatory Referral to [facility]; Future    7  Class 3 severe obesity due to excess calories with serious comorbidity and body mass index (BMI) of 40 0 to 44 9 in [facility])       Subjective:      Patient ID: Danielle Vuong is a 31 y o  female      HPI     Was seeing [facility]     Anxiety is terrible   Lost a family member and hours cut at work   SE of zofran and meclizine needed @25mg   Then when increased to 50mg   Still Obessesive compulsive anxiety   And ptsd - trauma   Does have her medical card   Did hlep the depression - make her be daily and doing the laundry   Letting in the light in her round   Sleep was better   Tried trazadone for sleep - foggy till Thalia Borrego 89  Working 2 extra side jobs just to make ends meet   Drinking more water and not alcohol   Reading again   Trying to get outside   Or getting to the gym   Walking the dogs   weather permitting       The following portions of the patient's history were reviewed and updated as appropriate: allergies, current medications, past family history, past medical history, past social history, past surgical history and problem list     Review of Systems   Constitutional: Negative for fever and unexpected weight change  HENT: Negative for nosebleeds and trouble swallowing  Eyes: Negative for visual disturbance  Respiratory: Negative for chest tightness and shortness of breath  Cardiovascular: Negative for chest pain, palpitations and leg swelling  Gastrointestinal: Negative for abdominal pain, constipation, diarrhea and nausea  Endocrine: Negative for cold intolerance  Genitourinary: Negative for dysuria and urgency  Musculoskeletal: Negative for joint swelling and myalgias  Skin: Negative for rash  Neurological: Negative for tremors, seizures and syncope  Hematological: Does not bruise/bleed easily  Psychiatric/Behavioral: Positive for sleep disturbance  Negative for hallucinations and suicidal ideas  The patient is nervous/anxious  Objective:      /78 (BP Location: Left arm, Patient Position: Sitting, Cuff Size: Large)   Pulse 59   Resp 16   Ht 5' 3" (1 6 m)   Wt 112 kg (246 lb)   SpO2 100%   BMI 43 58 kg/m²     No visits with results within 2 Week(s) from this visit     Latest known visit with results is:   Office Visit on 11/22/2021   Component Date Value    Case Report 11/22/2021                      Value:Gynecologic Cytology Report                       Case: Cameron Held Provider:  Angela Dunn MD     Collected:           11/22/2021 2693              Ordering Location:     [address] [place] [address]  Received:            11/22/2021 [address] [facility]                                                        First Screen:          Jairo Rowley                                                               Specimen:    LIQUID-BASED PAP, SCREENING, Endocervical                                                  Primary Interpretation 11/22/2021 Negative for intraepithelial lesion or malignancy     Specimen Adequacy 11/22/2021 Satisfactory for evaluation  Absence of endocervical/transformation zone component   Additional Information 11/22/2021                      Value: This result contains rich text formatting which cannot be displayed here  Physical Exam  Vitals and nursing note reviewed  Constitutional:       Appearance: She is well-developed  She is obese  HENT:      Head: Normocephalic and atraumatic  Cardiovascular:      Rate and Rhythm: Normal rate and regular rhythm  Heart sounds: Normal heart sounds  No murmur heard  Pulmonary:      Effort: Pulmonary effort is normal       Breath sounds: Normal breath sounds  No wheezing or rales  Abdominal:      General: Bowel sounds are normal  There is no distension  Palpations: Abdomen is soft  Tenderness: There is no abdominal tenderness  Musculoskeletal:         General: No tenderness  Normal range of motion  Cervical back: Normal range of motion and neck supple  Lymphadenopathy:      Cervical: No cervical adenopathy  Skin:     General: Skin is warm and dry  Capillary Refill: Capillary refill takes less than 2 seconds  Findings: No rash  Neurological:      Mental Status: She is alert and oriented to person, place, and time  Cranial Nerves: No cranial nerve deficit  Sensory: No sensory deficit  Motor: No abnormal muscle tone  Psychiatric:         Behavior: Behavior normal          Thought Content: Thought content normal          Judgment: Judgment normal            BMI Counseling:  Body mass index is 43 58 kg/m²  The BMI is above normal  Nutrition recommendations include decreasing portion sizes, encouraging healthy choices of fruits and vegetables and limiting drinks that contain sugar  Exercise recommendations include moderate physical activity 150 minutes/week  No pharmacotherapy was ordered  Rationale for BMI follow-up plan is due to patient being overweight or obese         Shelly Green MD  [place] [id number]

## 2022-05-26 ENCOUNTER — TELEPHONE (OUTPATIENT)
Dept: FAMILY MEDICINE CLINIC | Facility: CLINIC | Age: 27
End: 2022-05-26

## 2022-05-26 DIAGNOSIS — F41.9 ANXIETY: Primary | ICD-10-CM

## 2022-05-26 RX ORDER — ESCITALOPRAM OXALATE 20 MG/1
20 TABLET ORAL DAILY
Qty: 90 TABLET | Refills: 1 | Status: SHIPPED | OUTPATIENT
Start: 2022-05-26

## 2022-05-26 NOTE — TELEPHONE ENCOUNTER
Patient advised in the message that she was advised to increase her dosage from 10 mg to 20 mg now she is low oin medication     Please send a new script wether you wanted her to take 2 10 mg or 1 20 mg

## 2022-06-08 DIAGNOSIS — F41.9 ANXIETY: Primary | ICD-10-CM

## 2022-06-08 RX ORDER — HYDROXYZINE PAMOATE 25 MG/1
25 CAPSULE ORAL 3 TIMES DAILY PRN
Qty: 30 CAPSULE | Refills: 0 | Status: SHIPPED | OUTPATIENT
Start: 2022-06-08 | End: 2022-06-16 | Stop reason: SDUPTHER

## 2022-06-16 ENCOUNTER — TELEMEDICINE (OUTPATIENT)
Dept: FAMILY MEDICINE CLINIC | Facility: CLINIC | Age: 27
End: 2022-06-16
Payer: COMMERCIAL

## 2022-06-16 VITALS — BODY MASS INDEX: 43.59 KG/M2 | HEIGHT: 63 IN | WEIGHT: 246 LBS

## 2022-06-16 DIAGNOSIS — E66.01 CLASS 3 SEVERE OBESITY DUE TO EXCESS CALORIES WITH SERIOUS COMORBIDITY AND BODY MASS INDEX (BMI) OF 40.0 TO 44.9 IN ADULT (HCC): ICD-10-CM

## 2022-06-16 DIAGNOSIS — F41.9 ANXIETY: Primary | ICD-10-CM

## 2022-06-16 PROCEDURE — 3008F BODY MASS INDEX DOCD: CPT | Performed by: FAMILY MEDICINE

## 2022-06-16 PROCEDURE — 99213 OFFICE O/P EST LOW 20 MIN: CPT | Performed by: FAMILY MEDICINE

## 2022-06-16 PROCEDURE — 1036F TOBACCO NON-USER: CPT | Performed by: FAMILY MEDICINE

## 2022-06-16 RX ORDER — HYDROXYZINE PAMOATE 25 MG/1
25 CAPSULE ORAL 3 TIMES DAILY PRN
Qty: 90 CAPSULE | Refills: 0 | Status: SHIPPED | OUTPATIENT
Start: 2022-06-16 | End: 2022-07-12

## 2022-06-16 NOTE — PROGRESS NOTES
Virtual Regular Visit    Verification of patient location:    Patient is located in the following state in which I hold an active license [place]      Assessment/Plan:    Problem List Items Addressed This Visit    None     Visit Diagnoses     Anxiety    -  Primary    Relevant Medications    hydrOXYzine pamoate (VISTARIL) 25 mg capsule    Class 3 severe obesity due to excess calories with serious comorbidity and body mass index (BMI) of 40 0 to 44 9 in [facility])                   Reason for visit is   Chief Complaint   Patient presents with    Follow-up    Virtual Regular Visit        Encounter provider Lisseth Alexandra MD    Provider located at [address] [place] [phone]      Recent Visits  No visits were found meeting these conditions  Showing recent visits within past 7 days and meeting all other requirements  Today's Visits  Date Type Provider Dept   06/16/22 Telemedicine Lisseth Alexandra MD [facility]   Showing today's visits and meeting all other requirements  Future Appointments  No visits were found meeting these conditions  Showing future appointments within next 150 days and meeting all other requirements       The patient was identified by name and date of birth  Luther Noriega was informed that this is a telemedicine visit and that the visit is being conducted through [address] [place] [address] Now and patient was informed that this is a secure, HIPAA-compliant platform  She agrees to proceed     My office door was closed  No one else was in the room  She acknowledged consent and understanding of privacy and security of the video platform  The patient has agreed to participate and understands they can discontinue the visit at any time  Patient is aware this is a billable service  Jelani Felton is a 31 y o  female    HPI   Here to go over chronic issues and labs / imaging studies if applicable     Going well  Sleeping well   eating less   And  15 lbs wt loss too       Past Medical History:   Diagnosis Date    Asthma     Heavy menses     OCP use       Past Surgical History:   Procedure Laterality Date    MYRINGOTOMY W/ TUBES      TONSILLECTOMY      WISDOM TOOTH EXTRACTION         Current Outpatient Medications   Medication Sig Dispense Refill    albuterol (PROVENTIL HFA,VENTOLIN HFA) 90 mcg/act inhaler TAKE 2 PUFFS BY MOUTH EVERY 4 HOURS AS NEEDED FOR WHEEZE 18 g 1    clobetasol (TEMOVATE) 0 05 % cream Apply topically 2 (two) times a day 60 g 0    escitalopram (LEXAPRO) 20 mg tablet Take 1 tablet (20 mg total) by mouth daily 90 tablet 1    fluticasone (FLONASE) 50 mcg/act nasal spray 1 spray into each nostril daily 16 mL 0    hydrOXYzine pamoate (VISTARIL) 25 mg capsule Take 1 capsule (25 mg total) by mouth 3 (three) times a day as needed for anxiety 90 capsule 0    montelukast (SINGULAIR) 10 mg tablet Take 1 tablet (10 mg total) by mouth daily at bedtime 90 tablet 3    norethindrone-ethinyl estradiol-ferrous fumarate (Zuhair 24 FE) 1-20 MG-MCG(24) per tablet Take 1 tablet by mouth daily 84 tablet 4    SUMAtriptan (IMITREX) 25 mg tablet TAKE 1 TABLET (25 MG TOTAL) BY MOUTH ONCE AS NEEDED FOR MIGRAINE FOR UP TO 10 DOSES       No current facility-administered medications for this visit  Allergies   Allergen Reactions    Naproxen GI Intolerance    Pollen Extract Allergic Rhinitis       Review of Systems   Constitutional: Negative for fever and unexpected weight change  HENT: Negative for nosebleeds and trouble swallowing  Eyes: Negative for visual disturbance  Respiratory: Negative for chest tightness and shortness of breath  Cardiovascular: Negative for chest pain, palpitations and leg swelling  Gastrointestinal: Negative for abdominal pain, constipation, diarrhea and nausea  Endocrine: Negative for cold intolerance  Genitourinary: Negative for dysuria and urgency  Musculoskeletal: Negative for joint swelling and myalgias  Skin: Negative for rash  Neurological: Negative for tremors, seizures and syncope  Hematological: Does not bruise/bleed easily  Psychiatric/Behavioral: Negative for hallucinations and suicidal ideas  Video Exam    Vitals:    06/16/22 1404   Weight: 112 kg (246 lb)   Height: 5' 3" (1 6 m)       Physical Exam  Constitutional:       Appearance: She is well-developed  She is obese  Eyes:      Conjunctiva/sclera: Conjunctivae normal    Pulmonary:      Effort: Pulmonary effort is normal    Musculoskeletal:      Cervical back: Normal range of motion  Neurological:      Mental Status: She is alert and oriented to person, place, and time  Psychiatric:         Behavior: Behavior normal          Thought Content: Thought content normal          Judgment: Judgment normal           I spent 15 minutes directly with the patient during this visit    [id number] verbally agrees to participate in [facility]  Pt is aware that [facility] could be limited without vital signs or the ability to perform a full hands-on physical exam  Graciela Gina Treadwell understands she or the provider may request at any time to terminate the video visit and request the patient to seek care or treatment in person

## 2022-07-09 DIAGNOSIS — F41.9 ANXIETY: ICD-10-CM

## 2022-07-12 RX ORDER — HYDROXYZINE PAMOATE 25 MG/1
CAPSULE ORAL
Qty: 270 CAPSULE | Refills: 1 | Status: SHIPPED | OUTPATIENT
Start: 2022-07-12 | End: 2022-10-27

## 2022-10-04 DIAGNOSIS — F40.243 FEAR OF FLYING: Primary | ICD-10-CM

## 2022-10-04 RX ORDER — ALPRAZOLAM 0.25 MG/1
0.25 TABLET ORAL 2 TIMES DAILY PRN
Qty: 10 TABLET | Refills: 0 | Status: SHIPPED | OUTPATIENT
Start: 2022-10-04

## 2022-10-26 DIAGNOSIS — F41.9 ANXIETY: ICD-10-CM

## 2022-10-27 RX ORDER — HYDROXYZINE PAMOATE 25 MG/1
CAPSULE ORAL
Qty: 270 CAPSULE | Refills: 2 | Status: SHIPPED | OUTPATIENT
Start: 2022-10-27

## 2022-11-01 DIAGNOSIS — G47.09 OTHER INSOMNIA: Primary | ICD-10-CM

## 2022-11-01 RX ORDER — ESZOPICLONE 1 MG/1
1 TABLET, FILM COATED ORAL
Qty: 7 TABLET | Refills: 0 | Status: SHIPPED | OUTPATIENT
Start: 2022-11-01

## 2022-12-19 DIAGNOSIS — Z30.41 ENCOUNTER FOR SURVEILLANCE OF CONTRACEPTIVE PILLS: ICD-10-CM

## 2022-12-19 RX ORDER — NORETHINDRONE ACETATE/ETHINYL ESTRADIOL AND FERROUS FUMARATE 1MG-20(24)
1 KIT ORAL DAILY
Qty: 84 TABLET | Refills: 0 | Status: SHIPPED | OUTPATIENT
Start: 2022-12-19 | End: 2023-03-06

## 2023-03-05 DIAGNOSIS — Z30.41 ENCOUNTER FOR SURVEILLANCE OF CONTRACEPTIVE PILLS: ICD-10-CM

## 2023-03-06 RX ORDER — NORETHINDRONE ACETATE AND ETHINYL ESTRADIOL 1MG-20(24)
KIT ORAL
Qty: 84 TABLET | Refills: 0 | Status: SHIPPED | OUTPATIENT
Start: 2023-03-06

## 2023-05-31 DIAGNOSIS — Z30.41 ENCOUNTER FOR SURVEILLANCE OF CONTRACEPTIVE PILLS: ICD-10-CM

## 2023-06-01 RX ORDER — NORETHINDRONE ACETATE AND ETHINYL ESTRADIOL, AND FERROUS FUMARATE 1MG-20(24)
KIT ORAL
Qty: 84 TABLET | Refills: 0 | Status: SHIPPED | OUTPATIENT
Start: 2023-06-01

## 2023-07-27 DIAGNOSIS — Z30.41 ENCOUNTER FOR SURVEILLANCE OF CONTRACEPTIVE PILLS: ICD-10-CM

## 2023-07-27 RX ORDER — NORETHINDRONE ACETATE AND ETHINYL ESTRADIOL, AND FERROUS FUMARATE 1MG-20(24)
KIT ORAL
Qty: 28 TABLET | Refills: 2 | Status: SHIPPED | OUTPATIENT
Start: 2023-07-27

## 2023-08-03 DIAGNOSIS — L20.89 FLEXURAL ATOPIC DERMATITIS: ICD-10-CM

## 2023-08-03 RX ORDER — CLOBETASOL PROPIONATE 0.5 MG/G
CREAM TOPICAL 2 TIMES DAILY
Qty: 60 G | Refills: 0 | Status: SHIPPED | OUTPATIENT
Start: 2023-08-03